# Patient Record
Sex: MALE | Race: WHITE | NOT HISPANIC OR LATINO | Employment: OTHER | URBAN - NONMETROPOLITAN AREA
[De-identification: names, ages, dates, MRNs, and addresses within clinical notes are randomized per-mention and may not be internally consistent; named-entity substitution may affect disease eponyms.]

---

## 2022-12-12 RX ORDER — DEXTROAMPHETAMINE SACCHARATE, AMPHETAMINE ASPARTATE, DEXTROAMPHETAMINE SULFATE AND AMPHETAMINE SULFATE 2.5; 2.5; 2.5; 2.5 MG/1; MG/1; MG/1; MG/1
10 TABLET ORAL DAILY
COMMUNITY

## 2022-12-12 RX ORDER — BUPROPION HYDROCHLORIDE 150 MG/1
150 TABLET, EXTENDED RELEASE ORAL 2 TIMES DAILY
COMMUNITY

## 2022-12-12 NOTE — PROGRESS NOTES
[facility] - PODIATRY    Today's Date: 12/16/2022     Patient Name: Johnathon Perera  MRN: [id number]  CSN: [id number]  PCP: Rashad Miller MD  Referring Provider: No ref. provider found    SUBJECTIVE     Chief Complaint   Patient presents with   • Establish Care     Rashad Miller MD 12/02/2022-FOOT PAIN /- pt states right foot for while now having pain when walking in ball area of foot and same area on top of foot- pt pain 6/10 at worst, when pushing off with foot     HPI: Johnathon Perera, a 42 y.o.male, comes to clinic as a(n) new patient complaining of foot pain. Denies pertinent medical history. Relates that roughly for the past year, he has had pain to his right foot.  Denies injury or trauma.  Notes the pain is when he gets up on his toes or his on his feet for long periods of time.  Pain is mostly near the ball of his foot more specifically the second and third toes. Admits pain at 6/10 level and described as aching and dull. Denies previous treatment. Denies any constitutional symptoms. No other pedal complaints at this time.    History reviewed. No pertinent past medical history.  Past Surgical History:   Procedure Laterality Date   • KNEE ARTHROSCOPY Left      History reviewed. No pertinent family history.  Social History     Socioeconomic History   • Marital status:    Tobacco Use   • Smoking status: Never   • Smokeless tobacco: Never   Vaping Use   • Vaping Use: Never used   Substance and Sexual Activity   • Alcohol use: Yes     Alcohol/week: 8.0 standard drinks     Types: 6 Cans of beer, 2 Drinks containing 0.5 oz of alcohol per week   • Drug use: Never   • Sexual activity: Yes     Partners: Female     No Known Allergies  Current Outpatient Medications   Medication Sig Dispense Refill   • amphetamine-dextroamphetamine (ADDERALL) 10 MG tablet Take 10 mg by mouth Daily.     • buPROPion SR (WELLBUTRIN SR) 150 MG 12 hr tablet Take 150 mg by mouth 2 (Two) Times a Day.     •  hydrocortisone 2.5 % cream Apply 1 application topically to the appropriate area as directed 2 (Two) Times a Day.     • nystatin-triamcinolone (MYCOLOG II) 628414-4.1 UNIT/GM-% cream Apply 1 application topically to the appropriate area as directed 2 (Two) Times a Day.       No current facility-administered medications for this visit.     Review of Systems   Constitutional: Negative for chills and fever.   HENT: Negative for congestion.    Respiratory: Negative for shortness of breath.    Cardiovascular: Negative for chest pain and leg swelling.   Gastrointestinal: Negative for constipation, diarrhea, nausea and vomiting.   Musculoskeletal:        Foot pain   Skin: Negative for wound.   Neurological: Negative for numbness.       OBJECTIVE     Vitals:    12/16/22 1455   BP: 138/82   Pulse: 78   SpO2: 99%       PHYSICAL EXAM  GEN:   Accompanied by none.     Foot/Ankle Exam:       General:   Appearance: appears stated age and healthy    Orientation: AAOx3    Affect: appropriate    Gait: unimpaired    Assistance: independent    Shoe Gear:  Casual shoes    VASCULAR      Right Foot Vascularity   Dorsalis pedis:  2+  Posterior tibial:  2+  Skin Temperature: warm    Edema Grading:  None  CFT:  3  Pedal Hair Growth:  Present  Varicosities: none       Left Foot Vascularity   Dorsalis pedis:  2+  Posterior tibial:  2+  Skin Temperature: warm    Edema Grading:  None  CFT:  3  Pedal Hair Growth:  Present  Varicosities: none        NEUROLOGIC     Right Foot Neurologic   Normal sensation    Light touch sensation:  Normal  Vibratory sensation:  Normal  Hot/Cold sensation: normal    Protective Sensation using Dexter-Mary Monofilament:  10     Left Foot Neurologic   Normal sensation    Light touch sensation:  Normal  Vibratory sensation:  Normal  Hot/cold sensation: normal    Protective Sensation using Dexter-Mary Monofilament:  10     MUSCULOSKELETAL      Right Foot Musculoskeletal   Ecchymosis:  None  Tenderness: neuroma     Tenderness comment:  2nd interspace  Arch:  Normal     Left Foot Musculoskeletal   Ecchymosis:  None  Tenderness: none    Arch:  Normal     MUSCLE STRENGTH     Right Foot Muscle Strength   Foot dorsiflexion:  5  Foot plantar flexion:  5  Foot inversion:  5  Foot eversion:  5     Left Foot Muscle Strength   Foot dorsiflexion:  5  Foot plantar flexion:  5  Foot inversion:  5  Foot eversion:  5     RANGE OF MOTION      Right Foot Range of Motion   Foot and ankle ROM within normal limits       Left Foot Range of Motion   Foot and ankle ROM within normal limits       DERMATOLOGIC     Right Foot Dermatologic   Skin: skin intact       Left Foot Dermatologic   Skin: skin intact       Image:       RADIOLOGY/NUCLEAR:  No results found.    LABORATORY/CULTURE RESULTS:      PATHOLOGY RESULTS:       ASSESSMENT/PLAN     Diagnoses and all orders for this visit:    1. Plantar neuroma, right (Primary)  -     Nerve Block  -     triamcinolone acetonide (KENALOG-40) injection 10 mg  -     dexamethasone (DECADRON) injection 4 mg  -     lidocaine (XYLOCAINE) 1 % injection 1 mL    2. Foot pain, right  -     triamcinolone acetonide (KENALOG-40) injection 10 mg  -     dexamethasone (DECADRON) injection 4 mg  -     lidocaine (XYLOCAINE) 1 % injection 1 mL      Comprehensive lower extremity examination and evaluation was performed.  Discussed findings and treatment plan including risks, benefits, and treatment options with patient in detail. Patient agreed with treatment plan.  Clinical findings are consistent with plantar neuroma of the right foot second interspace.   Applied metatarsal pad to patient's insole.  After written consent obtained, neuroma injection performed to right foot second interspace.  An After Visit Summary was printed and given to the patient at discharge, including (if requested) any available informative/educational handouts regarding diagnosis, treatment, or medications. All questions were answered to patient/family  satisfaction. Should symptoms fail to improve or worsen they agree to call or return to clinic or to go to the Emergency Department. Discussed the importance of following up with any needed screening tests/labs/specialist appointments and any requested follow-up recommended by me today. Importance of maintaining follow-up discussed and patient accepts that missed appointments can delay diagnosis and potentially lead to worsening of conditions.  Return if symptoms worsen or fail to improve, for Follow-up with Dr. Crandall., or sooner if acute issues arise.      Nerve Block    Date/Time: 12/16/2022 5:07 PM  Performed by: Justus Crandall DPM  Authorized by: Justus Crandall DPM   Indications: pain relief  Body area: lower extremity  Nerve: plantar  Laterality: right (2nd interspace)    Sedation:  Patient sedated: no    Preparation: Patient was prepped and draped in the usual sterile fashion.  Patient position: sitting  Needle size: 25 G  Location technique: anatomical landmarks  Local Anesthetic: lidocaine 1% without epinephrine  Anesthetic total: 1 mL  Outcome: pain improved  Patient tolerance: patient tolerated the procedure well with no immediate complications  Comments: 1cc Dexamethasone, 0.5cc Kenalog 40          This document has been electronically signed by Justus Crandall DPM on December 16, 2022 16:38 CST

## 2022-12-15 ENCOUNTER — TELEPHONE (OUTPATIENT)
Dept: PODIATRY | Facility: CLINIC | Age: 42
End: 2022-12-15

## 2022-12-15 NOTE — TELEPHONE ENCOUNTER
Called patient regarding appt on 12/16/2022. Left message for patient to return call if any questions or concerns arise.

## 2022-12-16 ENCOUNTER — OFFICE VISIT (OUTPATIENT)
Dept: PODIATRY | Facility: CLINIC | Age: 42
End: 2022-12-16

## 2022-12-16 VITALS
SYSTOLIC BLOOD PRESSURE: 138 MMHG | DIASTOLIC BLOOD PRESSURE: 82 MMHG | HEIGHT: 73 IN | OXYGEN SATURATION: 99 % | BODY MASS INDEX: 26.77 KG/M2 | HEART RATE: 78 BPM | WEIGHT: 202 LBS

## 2022-12-16 DIAGNOSIS — G57.61 PLANTAR NEUROMA, RIGHT: Primary | ICD-10-CM

## 2022-12-16 DIAGNOSIS — M79.671 FOOT PAIN, RIGHT: ICD-10-CM

## 2022-12-16 PROCEDURE — 64455 NJX AA&/STRD PLTR COM DG NRV: CPT | Performed by: PODIATRIST

## 2022-12-16 PROCEDURE — 99203 OFFICE O/P NEW LOW 30 MIN: CPT | Performed by: PODIATRIST

## 2022-12-16 RX ORDER — LIDOCAINE HYDROCHLORIDE 10 MG/ML
1 INJECTION, SOLUTION INFILTRATION; PERINEURAL ONCE
Status: COMPLETED | OUTPATIENT
Start: 2022-12-16 | End: 2022-12-16

## 2022-12-16 RX ORDER — DEXAMETHASONE SODIUM PHOSPHATE 4 MG/ML
4 INJECTION, SOLUTION INTRA-ARTICULAR; INTRALESIONAL; INTRAMUSCULAR; INTRAVENOUS; SOFT TISSUE ONCE
Status: COMPLETED | OUTPATIENT
Start: 2022-12-16 | End: 2022-12-16

## 2022-12-16 RX ORDER — TRIAMCINOLONE ACETONIDE 40 MG/ML
0.25 INJECTION, SUSPENSION INTRA-ARTICULAR; INTRAMUSCULAR ONCE
Status: COMPLETED | OUTPATIENT
Start: 2022-12-16 | End: 2022-12-16

## 2022-12-16 RX ADMIN — TRIAMCINOLONE ACETONIDE 10 MG: 40 INJECTION, SUSPENSION INTRA-ARTICULAR; INTRAMUSCULAR at 15:12

## 2022-12-16 RX ADMIN — LIDOCAINE HYDROCHLORIDE 1 ML: 10 INJECTION, SOLUTION INFILTRATION; PERINEURAL at 15:11

## 2022-12-16 RX ADMIN — DEXAMETHASONE SODIUM PHOSPHATE 4 MG: 4 INJECTION, SOLUTION INTRA-ARTICULAR; INTRALESIONAL; INTRAMUSCULAR; INTRAVENOUS; SOFT TISSUE at 15:12
